# Patient Record
Sex: MALE | URBAN - METROPOLITAN AREA
[De-identification: names, ages, dates, MRNs, and addresses within clinical notes are randomized per-mention and may not be internally consistent; named-entity substitution may affect disease eponyms.]

---

## 2017-08-26 ENCOUNTER — APPOINTMENT (OUTPATIENT)
Age: 27
Setting detail: DERMATOLOGY
End: 2017-09-05

## 2017-08-26 DIAGNOSIS — B07.8 OTHER VIRAL WARTS: ICD-10-CM

## 2017-08-26 PROCEDURE — 17110 DESTRUCT B9 LESION 1-14: CPT

## 2017-08-26 PROCEDURE — 99201: CPT | Mod: 25

## 2017-08-26 PROCEDURE — OTHER COUNSELING: OTHER

## 2017-08-26 PROCEDURE — OTHER BENIGN DESTRUCTION: OTHER

## 2017-08-26 ASSESSMENT — LOCATION ZONE DERM: LOCATION ZONE: FINGER

## 2017-08-26 ASSESSMENT — LOCATION DETAILED DESCRIPTION DERM
LOCATION DETAILED: LEFT PROXIMAL DORSAL MIDDLE FINGER
LOCATION DETAILED: LEFT MIDDLE PROXIMAL INTERPHALANGEAL JOINT

## 2017-08-26 ASSESSMENT — LOCATION SIMPLE DESCRIPTION DERM: LOCATION SIMPLE: LEFT MIDDLE FINGER

## 2017-08-26 NOTE — PROCEDURE: BENIGN DESTRUCTION
Treatment Number (Will Not Render If 0): 0
Consent: The patient's consent was obtained including but not limited to risks of crusting, scabbing, blistering, scarring, darker or lighter pigmentary change, recurrence, incomplete removal and infection.
Render Post-Care Instructions In Note?: no
Medical Necessity Information: It is in your best interest to select a reason for this procedure from the list below. All of these items fulfill various CMS LCD requirements except the new and changing color options.
Medical Necessity Clause: This procedure was medically necessary because the lesions that were treated were:irritated
Anesthesia Volume In Cc: 1
Post-Care Instructions: I reviewed with the patient in detail post-care instructions. Patient is to wear sunprotection, and avoid picking at any of the treated lesions. Pt may apply Vaseline to crusted or scabbing areas.
Detail Level: Detailed
Include Z78.9 (Other Specified Conditions Influencing Health Status) As An Associated Diagnosis?: Yes

## 2017-09-16 ENCOUNTER — APPOINTMENT (OUTPATIENT)
Age: 27
Setting detail: DERMATOLOGY
End: 2017-09-21

## 2017-09-16 DIAGNOSIS — B07.8 OTHER VIRAL WARTS: ICD-10-CM

## 2017-09-16 PROCEDURE — 99213 OFFICE O/P EST LOW 20 MIN: CPT

## 2017-09-16 PROCEDURE — OTHER IN-HOUSE DISPENSING PHARMACY: OTHER

## 2017-09-16 PROCEDURE — OTHER COUNSELING: OTHER

## 2017-09-16 ASSESSMENT — LOCATION SIMPLE DESCRIPTION DERM: LOCATION SIMPLE: LEFT MIDDLE FINGER

## 2017-09-16 ASSESSMENT — LOCATION DETAILED DESCRIPTION DERM: LOCATION DETAILED: LEFT MIDDLE PROXIMAL INTERPHALANGEAL JOINT

## 2017-09-16 ASSESSMENT — LOCATION ZONE DERM: LOCATION ZONE: FINGER

## 2017-09-16 NOTE — PROCEDURE: IN-HOUSE DISPENSING PHARMACY
Product 3 Application Directions: Apply to affected area nightly for two weeks then as needed for flares

## 2017-09-16 NOTE — PROCEDURE: IN-HOUSE DISPENSING PHARMACY
Product 4 Application Directions: Apply a pea-size amount to face at bedtime.\\n\\nLot: [id number]\\nExp: 11/16/17 Product 4 Application Directions: Apply a pea-size amount to face at bedtime.\\n\\nLot: [id number]\\nExp: 11/16/17

## 2017-09-16 NOTE — PROCEDURE: IN-HOUSE DISPENSING PHARMACY
Product 7 Application Directions: Apply to the wart three times a week\\n\\nLot: [id number]\\nExp: 09/28/2017 Product 7 Application Directions: Apply to the wart three times a week\\n\\nLot: [id number]\\nExp: 09/28/2017